# Patient Record
Sex: FEMALE | Race: OTHER | HISPANIC OR LATINO
[De-identification: names, ages, dates, MRNs, and addresses within clinical notes are randomized per-mention and may not be internally consistent; named-entity substitution may affect disease eponyms.]

---

## 2023-11-07 ENCOUNTER — NON-APPOINTMENT (OUTPATIENT)
Age: 58
End: 2023-11-07

## 2023-11-08 ENCOUNTER — NON-APPOINTMENT (OUTPATIENT)
Age: 58
End: 2023-11-08

## 2023-11-08 ENCOUNTER — APPOINTMENT (OUTPATIENT)
Dept: OPHTHALMOLOGY | Facility: CLINIC | Age: 58
End: 2023-11-08
Payer: COMMERCIAL

## 2023-11-08 PROCEDURE — 92250 FUNDUS PHOTOGRAPHY W/I&R: CPT

## 2023-11-08 PROCEDURE — 92004 COMPRE OPH EXAM NEW PT 1/>: CPT

## 2023-11-08 PROCEDURE — 92136 OPHTHALMIC BIOMETRY: CPT

## 2024-01-05 ENCOUNTER — TRANSCRIPTION ENCOUNTER (OUTPATIENT)
Age: 59
End: 2024-01-05

## 2024-01-17 ENCOUNTER — NON-APPOINTMENT (OUTPATIENT)
Age: 59
End: 2024-01-17

## 2024-01-17 ENCOUNTER — APPOINTMENT (OUTPATIENT)
Dept: OPHTHALMOLOGY | Facility: CLINIC | Age: 59
End: 2024-01-17
Payer: COMMERCIAL

## 2024-01-17 PROCEDURE — 92136 OPHTHALMIC BIOMETRY: CPT

## 2024-03-06 PROBLEM — Z00.00 ENCOUNTER FOR PREVENTIVE HEALTH EXAMINATION: Status: ACTIVE | Noted: 2024-03-06

## 2024-03-14 RX ORDER — PHENYLEPHRINE HCL 2.5 %
1 DROPS OPHTHALMIC (EYE)
Refills: 0 | Status: DISCONTINUED | OUTPATIENT
Start: 2024-03-19 | End: 2024-03-19

## 2024-03-14 RX ORDER — SODIUM CHLORIDE 9 MG/ML
1000 INJECTION, SOLUTION INTRAVENOUS
Refills: 0 | Status: DISCONTINUED | OUTPATIENT
Start: 2024-03-19 | End: 2024-03-19

## 2024-03-14 RX ORDER — ACETAMINOPHEN 500 MG
650 TABLET ORAL EVERY 6 HOURS
Refills: 0 | Status: DISCONTINUED | OUTPATIENT
Start: 2024-03-19 | End: 2024-03-19

## 2024-03-14 RX ORDER — TROPICAMIDE 1 %
1 DROPS OPHTHALMIC (EYE)
Refills: 0 | Status: DISCONTINUED | OUTPATIENT
Start: 2024-03-19 | End: 2024-03-19

## 2024-03-14 RX ORDER — OFLOXACIN 0.3 %
1 DROPS OPHTHALMIC (EYE)
Refills: 0 | Status: DISCONTINUED | OUTPATIENT
Start: 2024-03-19 | End: 2024-03-19

## 2024-03-18 NOTE — ASU PATIENT PROFILE, ADULT - NS PREOP UNDERSTANDS INFO
No solid food/dairy/candy/gum after midnight; water allowed before 09:00am tomorrow; patient reminder to come with photo ID/insurance/credit card; dress in comfortable clothes; no jewelries/contact lens/valuable; no smoking/alcohol drinking/recreational drug use today; escort to have photo ID; address and callback number was given/yes

## 2024-03-18 NOTE — ASU PATIENT PROFILE, ADULT - REASON FOR ADMISSION, PROFILE
No solid food/dairy/candy/gum after midnight; water allowed before 05:30am tomorrow; patient reminder to come with photo ID/insurance/credit card; dress in comfortable clothes; no jewelries/contact lens/valuable; no smoking/alcohol drinking/recreational drug use today; escort to have photo ID; address and callback number was given

## 2024-03-18 NOTE — ASU PATIENT PROFILE, ADULT - FALL HARM RISK - UNIVERSAL INTERVENTIONS
Bed in lowest position, wheels locked, appropriate side rails in place/Call bell, personal items and telephone in reach/Instruct patient to call for assistance before getting out of bed or chair/Non-slip footwear when patient is out of bed/[place] to call system/Physically safe environment - no spills, clutter or unnecessary equipment/Purposeful Proactive Rounding/Room/bathroom lighting operational, light cord in reach

## 2024-03-18 NOTE — ASU PATIENT PROFILE, ADULT - NSICDXPASTMEDICALHX_GEN_ALL_CORE_FT
PAST MEDICAL HISTORY:  Asthma     Hypothyroid      PAST MEDICAL HISTORY:  Asthma hx    Hypothyroid resoved

## 2024-03-18 NOTE — ASU PATIENT PROFILE, ADULT - NSICDXPASTSURGICALHX_GEN_ALL_CORE_FT
PAST SURGICAL HISTORY:  S/P bilateral breast reduction     S/P knee surgery     S/P left knee surgery      PAST SURGICAL HISTORY:  H/O colonoscopy     S/P bilateral breast reduction 2000    S/P left knee surgery 2016

## 2024-03-19 ENCOUNTER — OUTPATIENT (OUTPATIENT)
Dept: OUTPATIENT SERVICES | Facility: HOSPITAL | Age: 59
LOS: 1 days | Discharge: HOME | End: 2024-03-19
Payer: COMMERCIAL

## 2024-03-19 ENCOUNTER — TRANSCRIPTION ENCOUNTER (OUTPATIENT)
Age: 59
End: 2024-03-19

## 2024-03-19 ENCOUNTER — APPOINTMENT (OUTPATIENT)
Dept: OPHTHALMOLOGY | Facility: AMBULATORY SURGERY CENTER | Age: 59
End: 2024-03-19

## 2024-03-19 VITALS
RESPIRATION RATE: 16 BRPM | HEART RATE: 57 BPM | SYSTOLIC BLOOD PRESSURE: 125 MMHG | DIASTOLIC BLOOD PRESSURE: 60 MMHG | HEIGHT: 62.5 IN | WEIGHT: 163.14 LBS | OXYGEN SATURATION: 98 % | TEMPERATURE: 98 F

## 2024-03-19 VITALS
OXYGEN SATURATION: 98 % | DIASTOLIC BLOOD PRESSURE: 63 MMHG | HEART RATE: 61 BPM | SYSTOLIC BLOOD PRESSURE: 100 MMHG | TEMPERATURE: 97 F | RESPIRATION RATE: 16 BRPM

## 2024-03-19 DIAGNOSIS — Z98.890 OTHER SPECIFIED POSTPROCEDURAL STATES: Chronic | ICD-10-CM

## 2024-03-19 PROCEDURE — 66984 XCAPSL CTRC RMVL W/O ECP: CPT | Mod: RT

## 2024-03-19 PROCEDURE — 6698F: CPT | Mod: RT

## 2024-03-19 DEVICE — IMPLANTABLE DEVICE
Type: IMPLANTABLE DEVICE | Site: RIGHT | Status: NON-FUNCTIONAL
Removed: 2024-03-19

## 2024-03-19 RX ORDER — ACETAMINOPHEN 500 MG
650 TABLET ORAL ONCE
Refills: 0 | Status: DISCONTINUED | OUTPATIENT
Start: 2024-03-19 | End: 2024-03-19

## 2024-03-19 RX ORDER — SODIUM CHLORIDE 9 MG/ML
500 INJECTION, SOLUTION INTRAVENOUS
Refills: 0 | Status: DISCONTINUED | OUTPATIENT
Start: 2024-03-19 | End: 2024-03-19

## 2024-03-19 RX ORDER — ONDANSETRON 8 MG/1
4 TABLET, FILM COATED ORAL ONCE
Refills: 0 | Status: DISCONTINUED | OUTPATIENT
Start: 2024-03-19 | End: 2024-03-19

## 2024-03-19 RX ADMIN — Medication 1 DROP(S): at 11:55

## 2024-03-19 RX ADMIN — SODIUM CHLORIDE 100 MILLILITER(S): 9 INJECTION, SOLUTION INTRAVENOUS at 14:00

## 2024-03-19 NOTE — ASU PREOP CHECKLIST - 1.
Pt states no signs or symptoms of Covid in the past 10 days

## 2024-03-19 NOTE — ASU DISCHARGE PLAN (ADULT/PEDIATRIC) - NS MD DC FALL RISK RISK
For information on Fall & Injury Prevention, visit: [email or web address] OR  [email or web address] OR  https://www.cdc.gov/steadi/patient.html
Madan Delaney(Resident)

## 2024-03-19 NOTE — ASU PREOP CHECKLIST - NS PREOP CHK HIBICLENS NA
Quality 226: Preventive Care And Screening: Tobacco Use: Screening And Cessation Intervention: Patient screened for tobacco use and is an ex/non-smoker Quality 110: Preventive Care And Screening: Influenza Immunization: Influenza immunization was not ordered or administered, reason not given Detail Level: Detailed N/A

## 2024-03-19 NOTE — OPERATIVE REPORT - OPERATIVE RPOSRT DETAILS
SURGEON: Trevin Marcano MD    ASSISTANT SURGEON:  Aaliyah Encinas MD    DATE OF SURGERY:  3/19/24    ANESTHESIA:  MAC/TOPICAL		    COMPLICATIONS: none		    PREOPERATIVE DIAGNOSIS:  Nuclear sclerosis cataract and astigmatism, right eye    POSTOPERATIVE DIAGNOSIS:  Nuclear sclerosis cataract and astigmatism, right eye    OPERATIVE PROCEDURE:  1. Femtosecond laser assisted laser surgery, right eye  2. Phacoemulsification with insertion of posterior chamber intraocular lens, right eye  3. Intraoperative ORA, left eye    LENS IMPLANT: [id number] +10.50     PROCEDURE:	The patient was brought to the laser room where certain aspects of the procedure were performed with the femtosecond laser.     The patient was then brought into the operating room and placed supine on the operating room table. After uneventful induction of neuroleptic anesthesia, topical tetracaine was instilled into the operative eye achieving sufficient anesthesia.  The patient was then prepped and draped in the usual sterile fashion.  A wire lid speculum was then inserted into the operative eye giving a wide palpebral fissure.      	A elna knife was used to perform paracenteses through clear cornea at the 12 and 6 o’clock limbal positions.  The anterior chamber was irrigated with lidocaine 1% nonpreserved followed by epinephrine 0.1% nonpreserved.  Viscoelastic was then used to maintain the anterior chamber.  A keratome was used to create a clear corneal incision just inside the temporal limbus.  Capsulorhexis forceps were used to complete the capsulorhexis. Balanced salt solution was used to hydrodissect the nucleus.  The [employer] phacoemulsification unit was then used to completely phacoemulsify the nucleus, following which an aspirating handpiece was used to aspirate all cortical remnants from the capsular bag.  Viscoelastic was  used to reform the anterior chamber and capsular bag.      	Intraoperative ORA was performed to help determine the appropriate IOL. A new foldable posterior chamber intraocular lens was brought into the surgical field.  It was folded and directly injected into the capsular bag following which ORA was again performed to determine the appropriate orientation of the IOL. All viscoelastic was then aspirated from the anterior segment using an aspirating handpiece.  All wounds were checked for leaks and there were none.  Tobradex ophthalmic solution was used to irrigate the surface of the eye.  The lid speculum was removed from the eye and a shield placed over the eye.      	The patient tolerated the procedure well and went to the recovery area in good condition.    			  ATTESTATION  I, Trevin Marcano, was present for the entirety of this surgical procedure.

## 2024-03-20 ENCOUNTER — NON-APPOINTMENT (OUTPATIENT)
Age: 59
End: 2024-03-20

## 2024-03-20 ENCOUNTER — APPOINTMENT (OUTPATIENT)
Dept: OPHTHALMOLOGY | Facility: CLINIC | Age: 59
End: 2024-03-20
Payer: COMMERCIAL

## 2024-03-20 PROCEDURE — 99024 POSTOP FOLLOW-UP VISIT: CPT

## 2024-03-21 PROBLEM — E03.9 HYPOTHYROIDISM, UNSPECIFIED: Chronic | Status: ACTIVE | Noted: 2024-03-18

## 2024-03-21 PROBLEM — J45.909 UNSPECIFIED ASTHMA, UNCOMPLICATED: Chronic | Status: ACTIVE | Noted: 2024-03-18

## 2024-03-22 NOTE — ASU PATIENT PROFILE, ADULT - NS PREOP UNDERSTANDS INFO
no solid food for 8 hours before surgery. no chewing gums or hard candy while NPO. Wear loose comfortable fitting clothes/yes

## 2024-03-22 NOTE — ASU PATIENT PROFILE, ADULT - NSICDXPASTSURGICALHX_GEN_ALL_CORE_FT
PAST SURGICAL HISTORY:  H/O colonoscopy     S/P bilateral breast reduction 2000    S/P left knee surgery 2016

## 2024-03-25 RX ORDER — ACETAMINOPHEN 500 MG
650 TABLET ORAL EVERY 6 HOURS
Refills: 0 | Status: DISCONTINUED | OUTPATIENT
Start: 2024-03-26 | End: 2024-03-26

## 2024-03-25 RX ORDER — SODIUM CHLORIDE 9 MG/ML
1000 INJECTION, SOLUTION INTRAVENOUS
Refills: 0 | Status: DISCONTINUED | OUTPATIENT
Start: 2024-03-26 | End: 2024-03-26

## 2024-03-26 ENCOUNTER — OUTPATIENT (OUTPATIENT)
Dept: OUTPATIENT SERVICES | Facility: HOSPITAL | Age: 59
LOS: 1 days | Discharge: HOME | End: 2024-03-26
Payer: COMMERCIAL

## 2024-03-26 ENCOUNTER — APPOINTMENT (OUTPATIENT)
Dept: OPHTHALMOLOGY | Facility: AMBULATORY SURGERY CENTER | Age: 59
End: 2024-03-26

## 2024-03-26 ENCOUNTER — TRANSCRIPTION ENCOUNTER (OUTPATIENT)
Age: 59
End: 2024-03-26

## 2024-03-26 VITALS
HEART RATE: 63 BPM | WEIGHT: 162.26 LBS | HEIGHT: 62 IN | DIASTOLIC BLOOD PRESSURE: 61 MMHG | OXYGEN SATURATION: 97 % | TEMPERATURE: 98 F | SYSTOLIC BLOOD PRESSURE: 109 MMHG | RESPIRATION RATE: 16 BRPM

## 2024-03-26 VITALS
RESPIRATION RATE: 16 BRPM | OXYGEN SATURATION: 98 % | SYSTOLIC BLOOD PRESSURE: 114 MMHG | HEART RATE: 57 BPM | TEMPERATURE: 99 F | DIASTOLIC BLOOD PRESSURE: 62 MMHG

## 2024-03-26 DIAGNOSIS — Z98.890 OTHER SPECIFIED POSTPROCEDURAL STATES: Chronic | ICD-10-CM

## 2024-03-26 PROCEDURE — 66984 XCAPSL CTRC RMVL W/O ECP: CPT | Mod: LT,79

## 2024-03-26 PROCEDURE — 6698F: CPT | Mod: LT,79

## 2024-03-26 DEVICE — IMPLANTABLE DEVICE
Type: IMPLANTABLE DEVICE | Site: LEFT | Status: NON-FUNCTIONAL
Removed: 2024-03-26

## 2024-03-26 RX ORDER — PREDNISOLONE SODIUM PHOSPHATE 1 %
0 DROPS OPHTHALMIC (EYE)
Refills: 0 | DISCHARGE

## 2024-03-26 RX ORDER — OFLOXACIN 0.3 %
1 DROPS OPHTHALMIC (EYE)
Refills: 0 | DISCHARGE

## 2024-03-26 RX ORDER — TROPICAMIDE 1 %
1 DROPS OPHTHALMIC (EYE)
Refills: 0 | Status: COMPLETED | OUTPATIENT
Start: 2024-03-26 | End: 2024-03-26

## 2024-03-26 RX ORDER — OFLOXACIN 0.3 %
1 DROPS OPHTHALMIC (EYE)
Refills: 0 | Status: COMPLETED | OUTPATIENT
Start: 2024-03-26 | End: 2024-03-26

## 2024-03-26 RX ORDER — ONDANSETRON 8 MG/1
4 TABLET, FILM COATED ORAL ONCE
Refills: 0 | Status: DISCONTINUED | OUTPATIENT
Start: 2024-03-26 | End: 2024-03-26

## 2024-03-26 RX ORDER — PHENYLEPHRINE HCL 2.5 %
1 DROPS OPHTHALMIC (EYE)
Refills: 0 | Status: COMPLETED | OUTPATIENT
Start: 2024-03-26 | End: 2024-03-26

## 2024-03-26 RX ORDER — KETOROLAC TROMETHAMINE 0.5 %
1 DROPS OPHTHALMIC (EYE)
Refills: 0 | DISCHARGE

## 2024-03-26 RX ADMIN — Medication 1 DROP(S): at 08:02

## 2024-03-26 RX ADMIN — Medication 1 DROP(S): at 08:01

## 2024-03-26 RX ADMIN — Medication 1 DROP(S): at 08:12

## 2024-03-26 RX ADMIN — Medication 1 DROP(S): at 08:07

## 2024-03-26 NOTE — ASU DISCHARGE PLAN (ADULT/PEDIATRIC) - NS MD DC FALL RISK RISK
For information on Fall & Injury Prevention, visit: [email or web address] OR  [email or web address] OR  https://www.cdc.gov/steadi/patient.html

## 2024-03-26 NOTE — OPERATIVE REPORT - OPERATIVE RPOSRT DETAILS
SURGEON: Trevin Marcano MD    ASSISTANT SURGEON:  Aaliyah Encinas MD    DATE OF SURGERY:  3/26/24	    ANESTHESIA:  MAC/TOPICAL	    ESTIMATED BLOOD LOSS: < 1mL	    COMPLICATIONS: none	    PREOPERATIVE DIAGNOSIS:  Nuclear sclerosis cataract and astigmatism,  left eye    POSTOPERATIVE DIAGNOSIS:  Nuclear sclerosis cataract and astigmatism, left eye    OPERATIVE PROCEDURE:  1. Femtosecond laser assisted laser surgery, left eye  2. Phacoemulsification with insertion of posterior chamber intraocular lens, left eye    LENS IMPLANT: CCA0T0 +13.50 D    PROCEDURE:	The patient was brought to the laser room where certain aspects of the procedure were performed with the femtosecond laser.     The patient was then brought into the operating room and placed supine on the operating room table. After uneventful induction of neuroleptic anesthesia, topical tetracaine was instilled into the operative eye achieving sufficient anesthesia.  The patient was then prepped and draped in the usual sterile fashion.  A wire lid speculum was then inserted into the operative eye giving a wide palpebral fissure.      	A lena knife was used to perform paracenteses through clear cornea at the 12 and 6 o’clock limbal positions.  The anterior chamber was irrigated with lidocaine 1% nonpreserved followed by epinephrine 0.1% nonpreserved.  Viscoelastic was then used to maintain the anterior chamber.  A keratome was used to create a clear corneal incision just inside the temporal limbus.  Capsulorhexis forceps were used to complete the capsulorhexis. Balanced salt solution was used to hydrodissect the nucleus.  The [employer] phacoemulsification unit was then used to completely phacoemulsify the nucleus, following which an aspirating handpiece was used to aspirate all cortical remnants from the capsular bag.  Viscoelastic was  used to reform the anterior chamber and capsular bag.      	A new foldable posterior chamber intraocular lens was brought into the surgical field.  It was folded and directly injected into the capsular bag following which it was centered and secure.  All viscoelastic was then aspirated from the anterior segment using an aspirating handpiece.  All wounds were checked for leaks and there were none.  Tobradex ophthalmic solution was used to irrigate the surface of the eye.  The lid speculum was removed from the eye and a shield placed over the eye.      	The patient tolerated the procedure well and went to the recovery area in good condition.      ATTESTATION    I, Trevin Marcano, was present for the entirety of the surgical procedure.

## 2024-03-27 ENCOUNTER — NON-APPOINTMENT (OUTPATIENT)
Age: 59
End: 2024-03-27

## 2024-03-27 ENCOUNTER — APPOINTMENT (OUTPATIENT)
Dept: OPHTHALMOLOGY | Facility: CLINIC | Age: 59
End: 2024-03-27
Payer: COMMERCIAL

## 2024-03-27 PROCEDURE — 99024 POSTOP FOLLOW-UP VISIT: CPT

## 2024-04-03 ENCOUNTER — APPOINTMENT (OUTPATIENT)
Dept: OPHTHALMOLOGY | Facility: CLINIC | Age: 59
End: 2024-04-03

## 2025-05-10 NOTE — PRE-ANESTHESIA EVALUATION ADULT - NSPROPOSEDPROCEDFT_GEN_ALL_CORE
Right eye cataract extraction with intraocular lens implant, with femto
PAST MEDICAL HISTORY:  Psychosis

## (undated) DEVICE — PACK CENTURION 2.4MM

## (undated) DEVICE — PACK ANTERIOR SEGMENT

## (undated) DEVICE — Device

## (undated) DEVICE — DRAPE MICROSCOPE KNOB COVER SMALL (2 PCS)

## (undated) DEVICE — SPECIMEN CONTAINER 4OZ

## (undated) DEVICE — GLV 8 PROTEXIS (WHITE)

## (undated) DEVICE — DRSG CURITY GAUZE SPONGE 4 X 4" 12-PLY

## (undated) DEVICE — SUT NYLON 10-0 12" CU-5

## (undated) DEVICE — CANNULA IRR ANT CHAMBER 30G

## (undated) DEVICE — CANNULA ANT CHMBR 27GX22MM

## (undated) DEVICE — SOL IRR BAL SALT 500ML